# Patient Record
Sex: MALE | Race: OTHER | NOT HISPANIC OR LATINO | URBAN - METROPOLITAN AREA
[De-identification: names, ages, dates, MRNs, and addresses within clinical notes are randomized per-mention and may not be internally consistent; named-entity substitution may affect disease eponyms.]

---

## 2018-03-29 ENCOUNTER — EMERGENCY (EMERGENCY)
Facility: HOSPITAL | Age: 23
LOS: 0 days | Discharge: HOME | End: 2018-03-29
Attending: EMERGENCY MEDICINE

## 2018-03-29 VITALS
SYSTOLIC BLOOD PRESSURE: 133 MMHG | DIASTOLIC BLOOD PRESSURE: 80 MMHG | TEMPERATURE: 98 F | OXYGEN SATURATION: 99 % | RESPIRATION RATE: 20 BRPM | HEART RATE: 71 BPM

## 2018-03-29 DIAGNOSIS — S09.90XA UNSPECIFIED INJURY OF HEAD, INITIAL ENCOUNTER: ICD-10-CM
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
## 2018-03-29 DIAGNOSIS — S06.0X0A CONCUSSION WITHOUT LOSS OF CONSCIOUSNESS, INITIAL ENCOUNTER: ICD-10-CM
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
## 2018-03-29 RX ORDER — ACETAMINOPHEN 500 MG
650 TABLET ORAL ONCE
Qty: 0 | Refills: 0 | Status: COMPLETED | OUTPATIENT
Start: 2018-03-29 | End: 2018-03-29

## 2018-03-29 RX ADMIN — Medication 650 MILLIGRAM(S): at 22:51

## 2018-03-29 RX ADMIN — Medication 650 MILLIGRAM(S): at 23:00

## 2018-03-29 NOTE — ED PROVIDER NOTE - NS ED ROS FT
Constitutional: No fever, chills.  Eyes: No visual changes.  ENT: No hearing changes.  Neck: No neck pain or stiffness.  Cardiovascular: No chest pain, palpitations, edema.  Pulmonary: No SOB, cough. No hemoptysis.  Abdominal:  No nausea, vomiting, diarrhea.  : No dysuria, frequency.  Neuro: see hpi  MS: No back pain. No calf pain/swelling.  Psych: No suicidal ideations.

## 2018-03-29 NOTE — ED PROVIDER NOTE - CARE PLAN
Principal Discharge DX:	Concussion without loss of consciousness, initial encounter  Secondary Diagnosis:	Headache

## 2018-03-29 NOTE — ED PROVIDER NOTE - OBJECTIVE STATEMENT
22 yold male to Ed with no signif med hx s/p head injury while playing basketball 5 days ago no loc - pt seen in local [facility]  2 days ago for headache; pt taking otc meds with improvement but headache perisistent; denies n/v, one sided weakness, fatigue, change in mental status; pt told to go to ED if sx persist.

## 2018-03-29 NOTE — ED PROVIDER NOTE - PHYSICAL EXAMINATION
Constitutional: Well developed, well nourished. NAD  Head: Normocephalic, atraumatic.  Eyes: PERRL, EOMI.  ENT: No nasal discharge. Mucous membranes dry.  Neck: Supple. Painless ROM.  Cardiovascular: Regular rate and rhythm.   Pulmonary:  Lungs clear to auscultation bilaterally.  Abdominal: Soft. Nondistended. No rebound, guarding, rigidity.  Extremities. Pelvis stable. No lower extremity edema, symmetric calves.  Skin: No rashes, cyanosis.  Neuro: AAOx3. No focal neurological deficits.  Psych: Normal mood. Normal affect.

## 2018-03-29 NOTE — ED PROVIDER NOTE - ATTENDING CONTRIBUTION TO CARE
I personally evaluated the patient. I reviewed the Resident’s or Physician Assistant’s note (as assigned above), and agree with the findings and plan except as documented in my note.  22 yr M with complaints of headache presistent since closed head injury 1 week ago. Pt was playing basketball and was elbowed on his occuipital head and his forehead as well. No LOC, vomiting, had dizziness that resolved. Is having daily headache. On exam GCS 15, CN II to XII wnl, normal motor and sensation exams, gait normal. Head ncat, normal s1s2, lungs ctab. Plan is CT brain and dispo accordingly.

## 2023-02-09 PROBLEM — Z00.00 ENCOUNTER FOR PREVENTIVE HEALTH EXAMINATION: Status: ACTIVE | Noted: 2023-02-09

## 2023-02-10 ENCOUNTER — NON-APPOINTMENT (OUTPATIENT)
Age: 28
End: 2023-02-10

## 2023-02-10 ENCOUNTER — APPOINTMENT (OUTPATIENT)
Dept: ORTHOPEDIC SURGERY | Facility: CLINIC | Age: 28
End: 2023-02-10
Payer: OTHER MISCELLANEOUS

## 2023-02-10 VITALS — HEIGHT: 74 IN | WEIGHT: 175 LBS | BODY MASS INDEX: 22.46 KG/M2

## 2023-02-10 DIAGNOSIS — S60.212A CONTUSION OF LEFT WRIST, INITIAL ENCOUNTER: ICD-10-CM

## 2023-02-10 PROCEDURE — 99202 OFFICE O/P NEW SF 15 MIN: CPT

## 2023-02-10 PROCEDURE — 99072 ADDL SUPL MATRL&STAF TM PHE: CPT

## 2023-02-10 NOTE — ASSESSMENT
[FreeTextEntry1] : Patient had a left wrist contusion.  Been advancing his activities he will see us back on an as-needed basis next week he will be able to return back to full work activities.

## 2023-02-10 NOTE — DATA REVIEWED
[FreeTextEntry1] : MRI of his left wrist was reviewed showing a bone contusion to the left wrist at the distal ulna on the dorsal side

## 2023-02-10 NOTE — HISTORY OF PRESENT ILLNESS
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  He did get an MRI done.  Patient is feeling better than he was before.

## 2023-02-10 NOTE — PHYSICAL EXAM
[de-identified] : Patient has no tenderness to palpation around the TFCC he has good supination pronation is normal capillary refill he has no erythema ecchymoses or abrasions he is tender to palpation at the distal ulna mainly on the dorsal side there is no swelling or ecchymoses present.